# Patient Record
Sex: FEMALE | Race: WHITE
[De-identification: names, ages, dates, MRNs, and addresses within clinical notes are randomized per-mention and may not be internally consistent; named-entity substitution may affect disease eponyms.]

---

## 2019-07-31 ENCOUNTER — HOSPITAL ENCOUNTER (INPATIENT)
Dept: HOSPITAL 25 - MCHOBOUT | Age: 34
LOS: 2 days | Discharge: HOME | DRG: 560 | End: 2019-08-02
Attending: OBSTETRICS & GYNECOLOGY | Admitting: OBSTETRICS & GYNECOLOGY
Payer: COMMERCIAL

## 2019-07-31 DIAGNOSIS — Z91.013: ICD-10-CM

## 2019-07-31 DIAGNOSIS — Z3A.37: ICD-10-CM

## 2019-07-31 DIAGNOSIS — O30.003: ICD-10-CM

## 2019-07-31 DIAGNOSIS — R76.11: ICD-10-CM

## 2019-07-31 LAB
BASOPHILS # BLD AUTO: 0 10^3/UL (ref 0–0.2)
EOSINOPHIL # BLD AUTO: 0.1 10^3/UL (ref 0–0.6)
HCT VFR BLD AUTO: 35 % (ref 35–47)
HGB BLD-MCNC: 12.1 G/DL (ref 12–16)
LYMPHOCYTES # BLD AUTO: 1.3 10^3/UL (ref 1–4.8)
MCH RBC QN AUTO: 28 PG (ref 27–31)
MCHC RBC AUTO-ENTMCNC: 34 G/DL (ref 31–36)
MCV RBC AUTO: 80 FL (ref 80–97)
MONOCYTES # BLD AUTO: 0.5 10^3/UL (ref 0–0.8)
NEUTROPHILS # BLD AUTO: 6.3 10^3/UL (ref 1.5–7.7)
NRBC # BLD AUTO: 0 10^3/UL
NRBC BLD QL AUTO: 0.1
PLATELET # BLD AUTO: 153 10^3/UL (ref 150–450)
RBC # BLD AUTO: 4.39 10^6 /UL (ref 3.7–4.87)
WBC # BLD AUTO: 8.2 10^3/UL (ref 3.5–10.8)

## 2019-07-31 PROCEDURE — 86850 RBC ANTIBODY SCREEN: CPT

## 2019-07-31 PROCEDURE — 36415 COLL VENOUS BLD VENIPUNCTURE: CPT

## 2019-07-31 PROCEDURE — 90707 MMR VACCINE SC: CPT

## 2019-07-31 PROCEDURE — 85025 COMPLETE CBC W/AUTO DIFF WBC: CPT

## 2019-07-31 PROCEDURE — 86900 BLOOD TYPING SEROLOGIC ABO: CPT

## 2019-07-31 PROCEDURE — 10907ZC DRAINAGE OF AMNIOTIC FLUID, THERAPEUTIC FROM PRODUCTS OF CONCEPTION, VIA NATURAL OR ARTIFICIAL OPENING: ICD-10-PCS | Performed by: OBSTETRICS & GYNECOLOGY

## 2019-07-31 PROCEDURE — 3E033VJ INTRODUCTION OF OTHER HORMONE INTO PERIPHERAL VEIN, PERCUTANEOUS APPROACH: ICD-10-PCS | Performed by: OBSTETRICS & GYNECOLOGY

## 2019-07-31 PROCEDURE — 76815 OB US LIMITED FETUS(S): CPT

## 2019-07-31 PROCEDURE — 4A1HXCZ MONITORING OF PRODUCTS OF CONCEPTION, CARDIAC RATE, EXTERNAL APPROACH: ICD-10-PCS | Performed by: OBSTETRICS & GYNECOLOGY

## 2019-07-31 PROCEDURE — 80307 DRUG TEST PRSMV CHEM ANLYZR: CPT

## 2019-07-31 PROCEDURE — 86901 BLOOD TYPING SEROLOGIC RH(D): CPT

## 2019-07-31 PROCEDURE — 0KQM0ZZ REPAIR PERINEUM MUSCLE, OPEN APPROACH: ICD-10-PCS | Performed by: OBSTETRICS & GYNECOLOGY

## 2019-07-31 RX ADMIN — PENICILLIN G POTASSIUM SCH MLS/HR: 20000000 POWDER, FOR SOLUTION INTRAVENOUS at 15:24

## 2019-07-31 RX ADMIN — PENICILLIN G POTASSIUM SCH MLS/HR: 20000000 POWDER, FOR SOLUTION INTRAVENOUS at 19:20

## 2019-07-31 NOTE — HP
General Information





- Reason for Visit





preeclampsia / twins 37 weeks





- General Information


Maternal Age: 34


Grav: 3


Para: 0


SAB: 2


IEA: 0





Estimated Due Date: 19


Determined By: Early Ultrasound


Maternal Blood Type and Rh: O Positive





- Results this Pregnancy


Serology/RPR Result: Non-Reactive


Rubella Result: Non-Immune


HBsAg Result: Negative


HIV Result: Negative


GBS Culture Result: Positive





Past Medical History


Delivery History: See  Records


Pertinent Past Medical History: See  Records - hx of latent TB


Pertinent Past Surgical History: See  Records


Pertinent Family History: Non-Contributory





- Antepartal Records


Antepartal Records: Reviewed, Pregnancy Complicated by: - mild preeclampsia 

since late 





Review of Systems


Constitutional: Comfortable


CV Complaint: No


Respiratory: Shortness of Breath: No


Gastrointestinal: No Nausea/Vomiting


Genitourinary: No Bleeding, No Leaking Fluid


Musculoskeletal: Pressure


Neurological: No Headache


Fetal Movement: Normal





Exam


Allergies/Adverse Reactions: 


Allergies





shellfish derived Allergy (Verified 19 08:37)


 Anaphylatic Shock








Lab Values - Entire Visit: 


 Laboratory Tests











  19





  08:41 08:41 08:41


 


WBC   8.2 


 


RBC   4.39 


 


Hgb   12.1 


 


Hct   35 


 


MCV   80 


 


MCH   28 


 


MCHC   34 


 


RDW   17 H 


 


Plt Count   153 


 


MPV   9.9 


 


Neut % (Auto)   76.9 


 


Lymph % (Auto)   15.9 


 


Mono % (Auto)   6.0 


 


Eos % (Auto)   0.7 


 


Baso % (Auto)   0.5 


 


Absolute Neuts (auto)   6.3 


 


Absolute Lymphs (auto)   1.3 


 


Absolute Monos (auto)   0.5 


 


Absolute Eos (auto)   0.1 


 


Absolute Basos (auto)   0.0 


 


Absolute Nucleated RBC   0.0 


 


Nucleated RBC %   0.1 


 


Urine Opiates Screen    None detected


 


Ur Barbiturates Screen    None detected


 


Ur Phencyclidine Scrn    None detected


 


Ur Amphetamines Screen    None detected


 


U Benzodiazepines Scrn    None detected


 


Urine Cocaine Screen    None detected


 


U Cannabinoids Screen    None detected


 


Blood Type  O Positive  














- Measurements


Height: 5 ft 11.5 in


Weight: 304 lb


Weight in lbs: 304.772691


Body Mass Index (BMI): 41.8


Pre-Pregnancy Weight: 265 lb


Weight Gained This Pregnancy: 39 lbs and 0 ozs





- Exam


Breast: Breast Exam Deferred


Extremities: No Edema


Heart: Normal Rhythm/Heart Sounds


HEENT: No Significant Findings


Lungs: Clear Bilaterally


Rectal: Rectal Exam Deferred


Reflexes: DTR 2+





Targeted Exam Findings


Cervical Exam: 1cm


Effacement: 80%


Station: -1


Presenting Part: Vertex - B vertex by sono


Membrane Status: AROM


Amniotic Fluid Evaluation: Gross Rupture





EFM Findings





- External Fetal Monitor Findings


External Fetal Monitor Findings: Accelerations Present, Variability Moderate


Contractions: Irregular, Mild





Assessment/Plan





- Assessment





twinns vtx/ vtx with mild preeclampsia at 37 weeks for induction of labor. 

process discussed. option of arom and pitocin discussed . questions answered.





- Obstetrical Risk Factors


Obstetrical Risk Factors: GBS Positive, Proteinuria, PreEclampsia





- Plan


Plan: Induction, Admit - Anticipate Vaginal Delivery

## 2019-08-01 LAB
BASOPHILS # BLD AUTO: 0.1 10^3/UL (ref 0–0.2)
EOSINOPHIL # BLD AUTO: 0 10^3/UL (ref 0–0.6)
HCT VFR BLD AUTO: 26 % (ref 35–47)
HGB BLD-MCNC: 8.8 G/DL (ref 12–16)
LYMPHOCYTES # BLD AUTO: 1.2 10^3/UL (ref 1–4.8)
MCH RBC QN AUTO: 27 PG (ref 27–31)
MCHC RBC AUTO-ENTMCNC: 34 G/DL (ref 31–36)
MCV RBC AUTO: 80 FL (ref 80–97)
MONOCYTES # BLD AUTO: 0.9 10^3/UL (ref 0–0.8)
NEUTROPHILS # BLD AUTO: 13.8 10^3/UL (ref 1.5–7.7)
NRBC # BLD AUTO: 0 10^3/UL
NRBC BLD QL AUTO: 0
PLATELET # BLD AUTO: 113 10^3/UL (ref 150–450)
RBC # BLD AUTO: 3.3 10^6 /UL (ref 3.7–4.87)
WBC # BLD AUTO: 15.9 10^3/UL (ref 3.5–10.8)

## 2019-08-01 RX ADMIN — ACETAMINOPHEN PRN MG: 325 TABLET ORAL at 00:46

## 2019-08-01 RX ADMIN — IBUPROFEN PRN MG: 600 TABLET, FILM COATED ORAL at 20:21

## 2019-08-01 RX ADMIN — DOCUSATE SODIUM SCH MG: 100 CAPSULE, LIQUID FILLED ORAL at 10:10

## 2019-08-01 RX ADMIN — Medication SCH MG: at 10:10

## 2019-08-01 RX ADMIN — ACETAMINOPHEN PRN MG: 325 TABLET ORAL at 06:55

## 2019-08-01 RX ADMIN — IBUPROFEN PRN MG: 600 TABLET, FILM COATED ORAL at 13:53

## 2019-08-01 RX ADMIN — PENICILLIN G POTASSIUM SCH: 20000000 POWDER, FOR SOLUTION INTRAVENOUS at 00:22

## 2019-08-01 RX ADMIN — Medication SCH MG: at 20:21

## 2019-08-01 RX ADMIN — IBUPROFEN PRN MG: 600 TABLET, FILM COATED ORAL at 06:55

## 2019-08-01 RX ADMIN — DOCUSATE SODIUM SCH MG: 100 CAPSULE, LIQUID FILLED ORAL at 13:52

## 2019-08-01 RX ADMIN — IBUPROFEN PRN MG: 600 TABLET, FILM COATED ORAL at 00:46

## 2019-08-01 RX ADMIN — DOCUSATE SODIUM SCH MG: 100 CAPSULE, LIQUID FILLED ORAL at 20:21

## 2019-08-01 RX ADMIN — ACETAMINOPHEN PRN MG: 325 TABLET ORAL at 23:23

## 2019-08-01 NOTE — PN
Progress Note





- Progress Note


Date of Service: 08/01/19 - PPD#1 s/p twin vaginal delivery


Note: 





Patient is PPD#1 s/p twin vertex, vertex vaginal delivery with 2nd degree 

vaginal laceration. She was induced at 37 weeks for mild preeclampsia.


She feels well.  She is tolerating regular diet.  She is voiding and had Bowel 

movement without difficulty.  Her vaginal bleeding has slowed. Her pain is well 

controlled.


Vital Signs:











Temp Pulse Resp BP Pulse Ox


 


 97.7 F   63   17   122/74   98 


 


 08/01/19 08:04  08/01/19 08:04  08/01/19 08:24  08/01/19 08:04  08/01/19 08:04








Fundus firm, nontender


lochia normal


extremities 1+edema, nontender


 Laboratory Results - last 24 hr











  07/31/19 08/01/19





  08:41 06:25


 


WBC   15.9 H


 


RBC   3.30 L


 


Hgb   8.8 L


 


Hct   26 L


 


MCV   80


 


MCH   27


 


MCHC   34


 


RDW   16 H


 


Plt Count   113 L


 


MPV   10.3


 


Neut % (Auto)   86.4


 


Lymph % (Auto)   7.5


 


Mono % (Auto)   5.5


 


Eos % (Auto)   0.1


 


Baso % (Auto)   0.5


 


Absolute Neuts (auto)   13.8 H


 


Absolute Lymphs (auto)   1.2


 


Absolute Monos (auto)   0.9 H


 


Absolute Eos (auto)   0.0


 


Absolute Basos (auto)   0.1


 


Absolute Nucleated RBC   0.0


 


Nucleated RBC %   0.0


 


Antibody Screen  Negative 








Assessment:  PPD#1 s/p twin vaginal delivery, mild anemia, maternal blood type 

Rh positive


Plan: continue supportive care, iron supplementation.


Leyda Blakely MD

## 2019-08-02 VITALS — SYSTOLIC BLOOD PRESSURE: 130 MMHG | DIASTOLIC BLOOD PRESSURE: 60 MMHG

## 2019-08-02 RX ADMIN — IBUPROFEN PRN MG: 600 TABLET, FILM COATED ORAL at 03:40

## 2019-08-02 RX ADMIN — ACETAMINOPHEN PRN MG: 325 TABLET ORAL at 06:39

## 2019-08-02 RX ADMIN — Medication SCH MG: at 08:09

## 2019-08-02 RX ADMIN — DOCUSATE SODIUM SCH MG: 100 CAPSULE, LIQUID FILLED ORAL at 08:10

## 2019-08-02 RX ADMIN — IBUPROFEN PRN MG: 600 TABLET, FILM COATED ORAL at 09:53

## 2019-08-03 NOTE — PROCNOTE
[facility]: Delivery Note





- Delivery


  ** A


Date of Birth: [birth year]


Time of Birth: 22:38


 Sex: Male - x 2


Gestational Age in Weeks and Days at Delivery: 37 Weeks and 0 Days


Delivery Method: Spontaneous Vaginal


Labor: Induced


Did Patient attempt ?: N/A, No Previous 


Amniotic Fluid: Clear


Estimated Blood Loss: 200


Anesthesia/Analgesia: CEI for Labor


Delivered By: Evan Weiss





- Nursery


Level of Nursery: Regular/Bedside





- Perineum


Perineal Injury: 2nd Degree





- Events


Delivery Events of Note: Pitocin During Labor, Full Course of Antibiotics





- Risk for Falls


Delivered OB Patient- Risk for Falls: Low Hematocrit (<29)


Fall Risk: Patient is at High Risk for Falls